# Patient Record
Sex: MALE | Race: WHITE | NOT HISPANIC OR LATINO | Employment: OTHER | URBAN - METROPOLITAN AREA
[De-identification: names, ages, dates, MRNs, and addresses within clinical notes are randomized per-mention and may not be internally consistent; named-entity substitution may affect disease eponyms.]

---

## 2019-12-14 ENCOUNTER — HOSPITAL ENCOUNTER (EMERGENCY)
Facility: HOSPITAL | Age: 39
Discharge: HOME | End: 2019-12-14
Attending: EMERGENCY MEDICINE | Admitting: EMERGENCY MEDICINE
Payer: COMMERCIAL

## 2019-12-14 ENCOUNTER — APPOINTMENT (EMERGENCY)
Dept: RADIOLOGY | Facility: HOSPITAL | Age: 39
End: 2019-12-14
Payer: COMMERCIAL

## 2019-12-14 VITALS
RESPIRATION RATE: 16 BRPM | OXYGEN SATURATION: 72 % | WEIGHT: 190 LBS | SYSTOLIC BLOOD PRESSURE: 123 MMHG | TEMPERATURE: 98.1 F | HEART RATE: 72 BPM | DIASTOLIC BLOOD PRESSURE: 66 MMHG

## 2019-12-14 DIAGNOSIS — S60.222A CONTUSION OF LEFT HAND, INITIAL ENCOUNTER: Primary | ICD-10-CM

## 2019-12-14 PROCEDURE — 73130 X-RAY EXAM OF HAND: CPT

## 2019-12-14 PROCEDURE — 99283 EMERGENCY DEPT VISIT LOW MDM: CPT

## 2019-12-15 NOTE — ED PROVIDER NOTES
History  Chief Complaint   Patient presents with    Hand Injury     patient reports johns of [employer] came down on left hand two weeks ago injuring the second third and fourth digit, has bruising on the third,      70-year-old male who presents to the ED approximately 2 weeks after he had the johns of the vehicle come down across his left hand  Patient has had swelling ecchymosis and pain to the 2nd 3rd and 4th digits of the left hand  He has had the 3rd digit in a splint since that time and still has difficulty moving it  History provided by:  Patient and spouse   used: No        None       History reviewed  No pertinent past medical history  History reviewed  No pertinent surgical history  History reviewed  No pertinent family history  I have reviewed and agree with the history as documented  Social History     Tobacco Use    Smoking status: Never Smoker    Smokeless tobacco: Never Used   Substance Use Topics    Alcohol use: Not Currently     Comment: rare    Drug use: Never        Review of Systems   Constitutional: Negative for activity change, chills, diaphoresis and fever  HENT: Negative for congestion, ear pain, nosebleeds, sore throat, trouble swallowing and voice change  Eyes: Negative for pain, discharge and redness  Respiratory: Negative for apnea, cough, choking, shortness of breath, wheezing and stridor  Cardiovascular: Negative for chest pain and palpitations  Gastrointestinal: Negative for abdominal distention, abdominal pain, constipation, diarrhea, nausea and vomiting  Endocrine: Negative for polydipsia  Genitourinary: Negative for difficulty urinating, dysuria, flank pain, frequency, hematuria and urgency  Musculoskeletal: Positive for arthralgias  Negative for back pain, gait problem, joint swelling, myalgias, neck pain and neck stiffness  Skin: Negative for pallor and rash     Neurological: Negative for dizziness, tremors, syncope, speech difficulty, weakness, numbness and headaches  Hematological: Negative for adenopathy  Psychiatric/Behavioral: Negative for confusion, hallucinations, self-injury and suicidal ideas  The patient is not nervous/anxious  Physical Exam  Physical Exam   Constitutional: He is oriented to person, place, and time  Vital signs are normal  He appears well-developed and well-nourished  No distress  HENT:   Head: Normocephalic and atraumatic  Right Ear: External ear normal    Left Ear: External ear normal    Nose: Nose normal    Mouth/Throat: Oropharynx is clear and moist    Eyes: Pupils are equal, round, and reactive to light  Conjunctivae are normal    Neck: Normal range of motion  Neck supple  Cardiovascular: Normal rate, regular rhythm, normal heart sounds and intact distal pulses  Pulmonary/Chest: Effort normal and breath sounds normal    Abdominal: Soft  Bowel sounds are normal    Musculoskeletal: Normal range of motion  He exhibits edema and tenderness  Patient has tenderness swelling from the PIP to the DI P across digits 2 3 and 4   Neurological: He is alert and oriented to person, place, and time  He has normal reflexes  Skin: Skin is warm and dry  He is not diaphoretic  Psychiatric: He has a normal mood and affect  Nursing note and vitals reviewed        Vital Signs  ED Triage Vitals [12/14/19 2126]   Temperature Pulse Respirations Blood Pressure SpO2   98 1 °F (36 7 °C) 72 16 123/66 (!) 72 %      Temp Source Heart Rate Source Patient Position - Orthostatic VS BP Location FiO2 (%)   Tympanic Monitor Lying Right arm --      Pain Score       No Pain           Vitals:    12/14/19 2126   BP: 123/66   Pulse: 72   Patient Position - Orthostatic VS: Lying         Visual Acuity      ED Medications  Medications - No data to display    Diagnostic Studies  Results Reviewed     None                 XR hand 3+ views LEFT    (Results Pending)              Procedures  Procedures         ED Course MDM      Disposition  Final diagnoses:   Contusion of left hand, initial encounter     Time reflects when diagnosis was documented in both MDM as applicable and the Disposition within this note     Time User Action Codes Description Comment    12/14/2019 10:02 PM Cortney, [address] Contusion of left hand, initial encounter       ED Disposition     ED Disposition Condition Date/Time Comment    Discharge Stable Sat Dec 14, 2019 10:02 PM Marcelo Montes discharge to home/self care  Follow-up Information     Follow up With Specialties Details Why Contact Info Additional Information    [address] [facility] Emergency Department Emergency Medicine  As needed [address] [place] [address]  [phone] [facility] ED, [facility], [facility]          Patient's Medications    No medications on file     No discharge procedures on file      ED Provider  Electronically Signed by           Kike Jones DO  12/14/19 4088

## 2019-12-30 DIAGNOSIS — I87.2 VENOUS INSUFFICIENCY OF BOTH LOWER EXTREMITIES: Primary | ICD-10-CM

## 2020-01-02 ENCOUNTER — APPOINTMENT (OUTPATIENT)
Dept: LAB | Facility: HOSPITAL | Age: 40
End: 2020-01-02
Payer: COMMERCIAL

## 2020-01-02 ENCOUNTER — TRANSCRIBE ORDERS (OUTPATIENT)
Dept: ADMINISTRATIVE | Facility: HOSPITAL | Age: 40
End: 2020-01-02

## 2020-01-02 DIAGNOSIS — Z22.4 CARRIER OR SUSPECTED CARRIER OF GONORRHEA: ICD-10-CM

## 2020-01-02 DIAGNOSIS — Z11.8 SCREENING EXAMINATION FOR TRACHOMA: ICD-10-CM

## 2020-01-02 DIAGNOSIS — Z11.3 SCREENING EXAMINATION FOR VENEREAL DISEASE: ICD-10-CM

## 2020-01-02 DIAGNOSIS — Z11.59 SCREENING EXAMINATION FOR POLIOMYELITIS: ICD-10-CM

## 2020-01-02 DIAGNOSIS — Z01.83 ENCOUNTER FOR BLOOD TYPING: ICD-10-CM

## 2020-01-02 DIAGNOSIS — Z01.83 ENCOUNTER FOR BLOOD TYPING: Primary | ICD-10-CM

## 2020-01-02 LAB
ABO GROUP BLD: NORMAL
RH BLD: NEGATIVE

## 2020-01-02 PROCEDURE — 86901 BLOOD TYPING SEROLOGIC RH(D): CPT

## 2020-01-02 PROCEDURE — 86900 BLOOD TYPING SEROLOGIC ABO: CPT

## 2020-01-02 PROCEDURE — 86790 VIRUS ANTIBODY NOS: CPT

## 2020-01-02 PROCEDURE — 86644 CMV ANTIBODY: CPT

## 2020-01-02 PROCEDURE — 87661 TRICHOMONAS VAGINALIS AMPLIF: CPT

## 2020-01-02 PROCEDURE — 87389 HIV-1 AG W/HIV-1&-2 AB AG IA: CPT

## 2020-01-02 PROCEDURE — 86706 HEP B SURFACE ANTIBODY: CPT

## 2020-01-02 PROCEDURE — 86592 SYPHILIS TEST NON-TREP QUAL: CPT

## 2020-01-02 PROCEDURE — 86803 HEPATITIS C AB TEST: CPT

## 2020-01-02 PROCEDURE — 86645 CMV ANTIBODY IGM: CPT

## 2020-01-02 PROCEDURE — 36415 COLL VENOUS BLD VENIPUNCTURE: CPT

## 2020-01-02 PROCEDURE — 87491 CHLMYD TRACH DNA AMP PROBE: CPT

## 2020-01-02 PROCEDURE — 87591 N.GONORRHOEAE DNA AMP PROB: CPT

## 2020-01-02 PROCEDURE — 86704 HEP B CORE ANTIBODY TOTAL: CPT

## 2020-01-03 ENCOUNTER — OFFICE VISIT (OUTPATIENT)
Dept: VASCULAR SURGERY | Facility: CLINIC | Age: 40
End: 2020-01-03
Payer: COMMERCIAL

## 2020-01-03 VITALS
HEIGHT: 70 IN | TEMPERATURE: 98.2 F | DIASTOLIC BLOOD PRESSURE: 62 MMHG | WEIGHT: 203 LBS | BODY MASS INDEX: 29.06 KG/M2 | SYSTOLIC BLOOD PRESSURE: 124 MMHG | HEART RATE: 57 BPM

## 2020-01-03 DIAGNOSIS — R09.89 DECREASED PULSES IN FEET: ICD-10-CM

## 2020-01-03 DIAGNOSIS — I87.2 VENOUS INSUFFICIENCY OF BOTH LOWER EXTREMITIES: Primary | ICD-10-CM

## 2020-01-03 LAB
HBV CORE AB SER QL: NORMAL
HBV SURFACE AB SER-ACNC: 3.94 MIU/ML
HCV AB SER QL: NORMAL
HIV 1+2 AB+HIV1 P24 AG SERPL QL IA: NORMAL
RPR SER QL: NORMAL

## 2020-01-03 PROCEDURE — 99244 OFF/OP CNSLTJ NEW/EST MOD 40: CPT | Performed by: PHYSICIAN ASSISTANT

## 2020-01-03 NOTE — PATIENT INSTRUCTIONS
Varicose veins with pain  Venous Insufficiency    42 y o  M longstanding history of bilateral leg pain  Patient reports that the legs get tired and painful  Noting that the legs are worse after standing or sitting for too long  He has developed venous discoloration to the R/L medial malleolus  He has been wearing compression but has continued to notice progression and worsening of the veins as well as continued symptoms  We had a detailed discussion regarding varicose veins and the treatment of venous disease  We discussed the role of compression and other conservative measures for relief of symptoms related to varicose veins      - Order for prescription graded compression stockings of 20-30 mm Hg  - Wear stocking EVERY day to help control swelling in legs and remove at night  - Elevate legs while at rest  - Continue healthy life-style changes; heart-healthy, low sodium diet; regular exercise  - Moisturize the legs to keep skin healthy  - Patient education for venous insufficiency  - Check venous reflux study  - Follow up in about 2 months        Varicose Veins   WHAT YOU NEED TO KNOW:   What are varicose veins? Varicose veins are veins that become large, twisted, and swollen  They are common on the back of the calves, knees, and thighs  Varicose veins are caused by valves in your veins that do not work properly  This causes blood to collect and increase pressure in the veins of your legs  The increased pressure causes your veins to stretch, get larger, swell, and twist        What increases my risk for varicose veins? · Pregnancy    · A family history of varicose veins    · Being overweight or obese    · Age 48 years or older    · Sitting or standing for long periods of time    · Wearing tight clothing  What are the signs and symptoms of varicose veins? Your symptoms may be worse after you stand or sit for long periods of time   You may have any of the following:  · Blue, purple, or bulging veins in your legs     · Pain, swelling, or muscle cramps in your legs    · Feeling of fatigue or heaviness in your legs  How are varicose veins diagnosed? Your healthcare provider will examine your legs and ask about your medical history  You may need tests, such as a Doppler ultrasound or duplex scan  These tests show your veins and valves, and how your blood is flowing through them  These tests may also show if there is a blockage or blood clot  How are varicose veins treated? The goal of treatment is to decrease symptoms, improve appearance, and prevent further problems  Treatment will depend on which veins are affected and how severe your condition is  You may need procedures to treat or remove your varicose veins  For example, your healthcare provider may inject a solution or use a laser to close the varicose veins  Surgery to remove long veins may also be done  Ask your healthcare provider for more information about procedures used to treat varicose veins  What can I do to manage my symptoms? · Do not sit or stand for long periods of time  This can cause the blood to collect in your legs and make your symptoms worse  Bend or rotate your ankles several times every hour  Walk around for a few minutes every hour to get blood moving in your legs  · Do not cross your legs when you sit  This decreases blood flow to your feet and can make your symptoms worse  · Do not wear tight clothing or shoes  Do not wear high-heeled shoes  Do not wear clothes that are tight around the waist or knees  · Maintain a healthy weight  Being overweight or obese can make your varicose veins worse  Ask your healthcare provider how much you should weigh  Ask him or her to help you create a weight loss plan if you are overweight  · Wear pressure stockings as directed  The stockings are tight and put pressure on your legs  They improve blood flow and help prevent clots  · Elevate your legs    Keep them above the level of your heart for 15 to 30 minutes several times a day  You can also prop the end of your bed up slightly to elevate your legs while you sleep  This will help blood to flow back to your heart  · Get regular exercise  Talk to your healthcare provider about the best exercise plan for you  Exercise can improve blood flow to your legs and feet  When should I seek immediate care? · You have a wound that does not heal or is infected  · You have an injury that has broken your skin and caused your varicose veins to bleed  · Your leg is swollen and hard  · You notice that your legs or feet are turning blue or black  · Your leg feels warm, tender, and painful  It may look swollen and red  When should I contact my healthcare provider? · You have pain in your leg that does not go away or gets worse  · You notice sudden large bruising on your legs  · You have a rash on your leg  · Your symptoms keep you from doing your daily activities  · You have questions or concerns about your condition or care  CARE AGREEMENT:   You have the right to help plan your care  Learn about your health condition and how it may be treated  Discuss treatment options with your caregivers to decide what care you want to receive  You always have the right to refuse treatment  The above information is an  only  It is not intended as medical advice for individual conditions or treatments  Talk to your doctor, nurse or pharmacist before following any medical regimen to see if it is safe and effective for you  © 2017 [employer] Information is for End User's use only and may not be sold, redistributed or otherwise used for commercial purposes  All illustrations and images included in CareNotes® are the copyrighted property of [employer]  or [employer]

## 2020-01-03 NOTE — LETTER
January 5, 2020     [facility]  [address] [facility] [address]    Patient: Sylvia Garcia   YOB: 1980   Date of Visit: 1/3/2020     Dear Dr Amanda Rangel      Thank you for referring Sylvia Garcia to me for evaluation  Below are the relevant portions of my assessment and plan of care  If you have questions, please do not hesitate to call me  I look forward to following Evelyn Lopez along with you  Sincerely,        Aylin Severino PA-C        CC: No Recipients    Progress Notes:      Assessment/Plan:    Venous insufficiency of both lower extremities    Varicose veins with pain  Venous Insufficiency    42 y o  M longstanding history of bilateral leg pain  Patient reports that the legs easily get tired and painful  The legs are worse after standing or sitting for too long  He has developed moderate venous discoloration to both the R/L medial malleolus  Despite wearing compression stockings for over 6 months, he has continued symptoms of tired, painful legs, worsening varicosities, as well as discoloration of legs  No hx bleeding veins, ulcers or blood clots  Discussion:  We had a detailed discussion regarding varicose veins and the treatment of venous disease  We discussed the role of compression and other conservative measures for relief of symptoms related to varicose veins      - Order for prescription graded compression stockings of 20-30 mm Hg  - Wear stocking EVERY day to help control swelling in legs and remove at night  - Elevate legs while at rest  - Continue healthy life-style changes; heart-healthy, low sodium diet; regular exercise for weight loss  - Patient education for venous insufficiency    - Check venous reflux study  - Follow up in about 2 months with VS    Decreased pulses in feet  -Difficult to assess L DP pulses  -Excellent PT pulses/signals bilaterally  -Active no sx of claudication  -Check baseline CHAD per patient request

## 2020-01-03 NOTE — PROGRESS NOTES
Assessment/Plan:    Venous insufficiency of both lower extremities    Varicose veins with pain  Venous Insufficiency    42 y o  M longstanding history of bilateral leg pain  Patient reports that the legs easily get tired and painful  The legs are worse after standing or sitting for too long  He has developed mild-moderate venous discoloration to both the R/L medial malleolus  Despite wearing compression stockings for over 6 months, he has continued symptoms of tired, painful legs, worsening varicosities, as well as discoloration of legs  No hx bleeding veins, ulcers or blood clots  Discussion:  We had a detailed discussion regarding varicose veins and the treatment of venous disease  We discussed the role of compression and other conservative measures for relief of symptoms related to varicose veins      - Order for prescription graded compression stockings of 20-30 mm Hg  - Wear stocking EVERY day to help control swelling in legs and remove at night  - Elevate legs while at rest  - Continue healthy life-style changes; heart-healthy, low sodium diet; regular exercise for weight loss  - Patient education for venous insufficiency  - Check venous reflux study  - Follow up in about 2 months with VS    Decreased pulses in feet  -Difficult to assess L DP pulses  -Excellent PT pulses/signals bilaterally  -Active; no sx of claudication  -Check baseline CHAD per patient request       Subjective:      Patient ID: Jeri Spangler is a 42 y o  male  New patient referred by Marco A Mercer MD  Patient presents in office for B/L leg discoloration and pain x 10 years  Patient denies it effects his walking, but he feels like his legs are more tired  Patient reports dicoloration of blue, red, and purple at times in the ankle region  Patient denies any swelling  Patient reports wearing compression socks  Patient denies any injury to his ankles or legs  Patient is a someday smoker of hooka/vaping      HPI     Jeri Spangler 60 y o  Shayy Sizer longstanding history of bilateral leg pain  Patient reports that the legs easily get tired and painful  The legs are worse after standing or sitting for too long  He has developed mild to moderate venous discoloration to both the R/L medial malleolus  Despite wearing compression stockings for over 6 months, he has continued symptoms of tired, painful legs, worsening varicosities, as well as discoloration of legs  No hx bleeding veins, ulcers or blood clots  He is accompanied by Dr Igor Thomas  The following portions of the patient's history were reviewed and updated as appropriate: allergies, current medications, past family history, past medical history, past social history, past surgical history and problem list     Review of Systems   Constitutional: Negative  HENT: Negative  Eyes: Negative  Respiratory: Negative  Cardiovascular: Negative  Negative for leg swelling  Gastrointestinal: Negative  Endocrine: Negative  Genitourinary: Negative  Musculoskeletal: Negative  Leg pain   Skin: Positive for color change  Allergic/Immunologic: Negative  Neurological: Negative  Hematological: Negative  Psychiatric/Behavioral: Negative  Objective:      /62 (BP Location: Left arm, Patient Position: Sitting, Cuff Size: Adult)   Pulse 57   Temp 98 2 °F (36 8 °C) (Tympanic)   Ht 5' 10" (1 778 m)   Wt 92 1 kg (203 lb)   BMI 29 13 kg/m²          Physical Exam   Constitutional: He is oriented to person, place, and time  He appears well-developed and well-nourished  He is cooperative  HENT:   Head: Normocephalic and atraumatic  Eyes: Pupils are equal, round, and reactive to light  EOM are normal    Neck: Trachea normal  Neck supple  No JVD present  No thyromegaly present  Cardiovascular: Normal rate, regular rhythm, S1 normal, S2 normal and normal heart sounds  Exam reveals no gallop and no friction rub  No murmur heard    Pulses:       Carotid pulses are 2+ on the right side, and 2+ on the left side  Radial pulses are 2+ on the right side, and 2+ on the left side  Dorsalis pedis pulses are 1+ on the right side  Posterior tibial pulses are 2+ on the right side, and 1+ on the left side  DP pulses difficulty to assess  There are good PT pulses  Excellent PT signals bilaterally  Feet are motor-sensory intact with normal cap refill  No wounds  + small/spider veins around the ankles with early stasis changes; skin healthy and intact  Pulmonary/Chest: Effort normal and breath sounds normal  No accessory muscle usage  No respiratory distress  He has no wheezes  He has no rales  Abdominal: Soft  Bowel sounds are normal  He exhibits no distension  There is no hepatosplenomegaly  There is no tenderness  Musculoskeletal: Normal range of motion  He exhibits no edema or deformity  Neurological: He is alert and oriented to person, place, and time  Grossly normal    Skin: Skin is warm and dry  No lesion and no rash noted  No cyanosis  Nails show no clubbing  Psychiatric: He has a normal mood and affect  Nursing note and vitals reviewed  I have reviewed and made appropriate changes to the review of systems input by the medical assistant  Vitals:    01/03/20 0909   BP: 124/62   BP Location: Left arm   Patient Position: Sitting   Cuff Size: Adult   Pulse: 57   Temp: 98 2 °F (36 8 °C)   TempSrc: Tympanic   Weight: 92 1 kg (203 lb)   Height: 5' 10" (1 778 m)       Patient Active Problem List   Diagnosis    Venous insufficiency of both lower extremities    Decreased pulses in feet       History reviewed  No pertinent surgical history  History reviewed  No pertinent family history      Social History     Socioeconomic History    Marital status: /Civil Union     Spouse name: Not on file    Number of children: Not on file    Years of education: Not on file    Highest education level: Not on file   Occupational History    Not on file   Social Needs    Financial resource strain: Not on file    Food insecurity:     Worry: Not on file     Inability: Not on file    Transportation needs:     Medical: Not on file     Non-medical: Not on file   Tobacco Use    Smoking status: Current Some Day Smoker    Smokeless tobacco: Never Used   Substance and Sexual Activity    Alcohol use: Not Currently     Comment: rare    Drug use: Never    Sexual activity: Not on file   Lifestyle    Physical activity:     Days per week: Not on file     Minutes per session: Not on file    Stress: Not on file   Relationships    Social connections:     Talks on phone: Not on file     Gets together: Not on file     Attends Taoist service: Not on file     Active member of club or organization: Not on file     Attends meetings of clubs or organizations: Not on file     Relationship status: Not on file    Intimate partner violence:     Fear of current or ex partner: Not on file     Emotionally abused: Not on file     Physically abused: Not on file     Forced sexual activity: Not on file   Other Topics Concern    Not on file   Social History Narrative    Not on file       No Known Allergies    No current outpatient medications on file

## 2020-01-04 LAB
C TRACH DNA SPEC QL NAA+PROBE: NEGATIVE
CMV IGG SERPL IA-ACNC: <0.6 U/ML (ref 0–0.59)
CMV IGM SERPL IA-ACNC: <30 AU/ML (ref 0–29.9)
N GONORRHOEA DNA SPEC QL NAA+PROBE: NEGATIVE

## 2020-01-05 PROBLEM — R09.89 DECREASED PULSES IN FEET: Status: ACTIVE | Noted: 2020-01-05

## 2020-01-05 NOTE — ASSESSMENT & PLAN NOTE
Varicose veins with pain  Venous Insufficiency    42 y o  M longstanding history of bilateral leg pain  Patient reports that the legs easily get tired and painful  The legs are worse after standing or sitting for too long  He has developed mild-moderate venous discoloration to both the R/L medial malleolus  Despite wearing compression stockings for over 6 months, he has continued symptoms of tired, painful legs, worsening varicosities, as well as discoloration of legs  No hx bleeding veins, ulcers or blood clots  Discussion:  We had a detailed discussion regarding varicose veins and the treatment of venous disease  We discussed the role of compression and other conservative measures for relief of symptoms related to varicose veins      - Order for prescription graded compression stockings of 20-30 mm Hg  - Wear stocking EVERY day to help control swelling in legs and remove at night  - Elevate legs while at rest  - Continue healthy life-style changes; heart-healthy, low sodium diet; regular exercise for weight loss  - Patient education for venous insufficiency    - Check venous reflux study  - Follow up in about 2 months with VS

## 2020-01-05 NOTE — ASSESSMENT & PLAN NOTE
-Difficult to assess L DP pulses  -Excellent PT pulses/signals bilaterally  -Active; no sx of claudication  -Check baseline CHAD per patient request

## 2020-01-06 LAB
HTLV I+II AB SER QL IA: NEGATIVE
T VAGINALIS RRNA SPEC QL NAA+PROBE: NEGATIVE

## 2020-01-08 ENCOUNTER — HOSPITAL ENCOUNTER (OUTPATIENT)
Dept: RADIOLOGY | Facility: HOSPITAL | Age: 40
Discharge: HOME | End: 2020-01-08
Payer: COMMERCIAL

## 2020-01-08 DIAGNOSIS — I87.2 VENOUS INSUFFICIENCY OF BOTH LOWER EXTREMITIES: ICD-10-CM

## 2020-01-08 LAB
CMV DNA SERPL NAA+PROBE-ACNC: NEGATIVE IU/ML
CMV DNA SERPL NAA+PROBE-LOG IU: NORMAL LOG10 IU/ML

## 2020-01-08 PROCEDURE — 93923 UPR/LXTR ART STDY 3+ LVLS: CPT

## 2020-01-08 PROCEDURE — 93970 EXTREMITY STUDY: CPT | Performed by: SURGERY

## 2020-01-08 PROCEDURE — 93970 EXTREMITY STUDY: CPT

## 2020-01-08 PROCEDURE — 93923 UPR/LXTR ART STDY 3+ LVLS: CPT | Performed by: SURGERY

## 2020-01-09 ENCOUNTER — OFFICE VISIT (OUTPATIENT)
Dept: VASCULAR SURGERY | Facility: CLINIC | Age: 40
End: 2020-01-09
Payer: COMMERCIAL

## 2020-01-09 VITALS
HEIGHT: 70 IN | TEMPERATURE: 98.1 F | HEART RATE: 70 BPM | BODY MASS INDEX: 28.77 KG/M2 | SYSTOLIC BLOOD PRESSURE: 102 MMHG | DIASTOLIC BLOOD PRESSURE: 76 MMHG | WEIGHT: 201 LBS

## 2020-01-09 DIAGNOSIS — I87.2 VENOUS INSUFFICIENCY (CHRONIC) (PERIPHERAL): Primary | ICD-10-CM

## 2020-01-09 DIAGNOSIS — R09.89 DECREASED PULSES IN FEET: ICD-10-CM

## 2020-01-09 DIAGNOSIS — I87.2 VENOUS INSUFFICIENCY OF BOTH LOWER EXTREMITIES: ICD-10-CM

## 2020-01-09 PROCEDURE — 99213 OFFICE O/P EST LOW 20 MIN: CPT | Performed by: SURGERY

## 2020-01-09 NOTE — PATIENT INSTRUCTIONS
Decreased pulses in feet  Decreased pulses in feet  CHAD study obtained was normal with triphasic waveforms throughout  Faint palpable DP/PT pulses  Denies claudication, rest pain or tissue loss       Venous insufficiency of both lower extremities  Bilateral ankle telangiectasis with mild venous congestion, no significant varicosities  Chronic ankle aching  Preliminary venous reflux study suggestive of left GSV venous insufficiency and deep venous incompetence  Has been wearing over the counter below the knee compression socks which end at the ankle intermittently, unknown degree of compression      -We discussed the physiology of venous disease, available treatment options and indications for treatment  -Recommended a trial of conservative measures   This includes the daily use of gradient compression socks, periodic leg elevation and regular exercise  -Rx 20-30 mmHg compression given

## 2020-01-09 NOTE — ASSESSMENT & PLAN NOTE
Decreased pulses in feet  CHAD study obtained was normal with triphasic waveforms throughout  Faint palpable DP/PT pulses  Denies claudication, rest pain or tissue loss

## 2020-01-09 NOTE — ASSESSMENT & PLAN NOTE
Bilateral ankle telangiectasis with mild venous congestion, no significant varicosities  Chronic ankle aching  Preliminary venous reflux study suggestive of left GSV venous insufficiency and deep venous incompetence  Has been wearing over the counter below the knee compression socks which end at the ankle intermittently, unknown degree of compression     -We discussed the physiology of venous disease, available treatment options and indications for treatment  -Recommended a trial of conservative measures  This includes the daily use of gradient compression socks, periodic leg elevation and regular exercise  -Rx 20-30 mmHg compression given

## 2020-01-09 NOTE — PROGRESS NOTES
Assessment/Plan:    Decreased pulses in feet  Decreased pulses in feet  CHAD study obtained was normal with triphasic waveforms throughout  Faint palpable DP/PT pulses  Denies claudication, rest pain or tissue loss  Venous insufficiency of both lower extremities  Bilateral ankle telangiectasis with mild venous congestion, no significant varicosities  Chronic ankle aching  Preliminary venous reflux study suggestive of left GSV venous insufficiency and deep venous incompetence  Has been wearing over the counter below the knee compression socks which end at the ankle intermittently, unknown degree of compression     -We discussed the physiology of venous disease, available treatment options and indications for treatment  -Recommended a trial of conservative measures  This includes the daily use of gradient compression socks, periodic leg elevation and regular exercise  -Rx 20-30 mmHg compression given  Diagnoses and all orders for this visit:    Venous insufficiency (chronic) (peripheral)  -     Compression Stocking    Venous insufficiency of both lower extremities    Decreased pulses in feet        I have spent 25 minutes with Patient  today in which greater than 50% of this time was spent in counseling/coordination of care regarding Intructions for management, Importance of tx compliance and Impressions  Subjective:      Patient ID: Nani Hensley is a 42 y o  male  Patient presents today to review testing of CHAD and reflux study done on 1/8  Patient has longstanding h/o b/l leg pain, though is currently denies leg pain  Has c/o bruising to ankles  Denies swelling  HPI  Mr Ivet Rust is a 48yo male with history of bilateral ankle aching and telangiectasias  He was seen recently by our office and sent for venous reflux studies  He returns for follow-up today  He continues to report ankle aching  He does not have significant swelling  No varicosities   He wears compression stockings that he bought at [employer] which go from the ankle to below the knee intermittently and has not seen much benefit  He does not routinely elevate his legs and personally feels he could exercise more  The following portions of the patient's history were reviewed and updated as appropriate: allergies, current medications, past family history, past medical history, past social history, past surgical history and problem list     Review of Systems   Constitutional: Negative  HENT: Negative  Eyes: Negative  Respiratory: Negative  Cardiovascular: Negative  Gastrointestinal: Negative  Endocrine: Negative  Genitourinary: Negative  Musculoskeletal: Negative  Skin: Negative  Allergic/Immunologic: Negative  Neurological: Negative  Hematological: Negative  Psychiatric/Behavioral: Negative  I have personally reviewed the ROS entered by MA and agree as documented  Objective:      /76 (BP Location: Right arm, Patient Position: Sitting)   Pulse 70   Temp 98 1 °F (36 7 °C) (Tympanic)   Ht 5' 10" (1 778 m)   Wt 91 2 kg (201 lb)   BMI 28 84 kg/m²          Physical Exam   Constitutional: He is oriented to person, place, and time  He appears well-developed and well-nourished  HENT:   Head: Normocephalic and atraumatic  Eyes: EOM are normal    Neck: Normal range of motion  Neck supple  Cardiovascular: Normal rate and regular rhythm  Pulses:       Dorsalis pedis pulses are 1+ on the right side, and 1+ on the left side  Posterior tibial pulses are 1+ on the right side, and 1+ on the left side  Pulmonary/Chest: Effort normal    Abdominal: Soft  Musculoskeletal: Normal range of motion  Neurological: He is alert and oriented to person, place, and time  Skin: Skin is warm and dry  Capillary refill takes less than 2 seconds  Bilateral ankle telangiectasias with purplish discoloration   Psychiatric: He has a normal mood and affect   His behavior is normal  Judgment and thought content normal    Nursing note and vitals reviewed

## 2020-02-04 DIAGNOSIS — R10.32 LEFT INGUINAL PAIN: ICD-10-CM

## 2020-02-25 ENCOUNTER — APPOINTMENT (OUTPATIENT)
Dept: LAB | Facility: HOSPITAL | Age: 40
End: 2020-02-25
Payer: COMMERCIAL

## 2020-02-25 ENCOUNTER — TRANSCRIBE ORDERS (OUTPATIENT)
Dept: ADMINISTRATIVE | Facility: HOSPITAL | Age: 40
End: 2020-02-25

## 2020-02-25 ENCOUNTER — HOSPITAL ENCOUNTER (OUTPATIENT)
Dept: RADIOLOGY | Facility: HOSPITAL | Age: 40
Discharge: HOME | End: 2020-02-25
Attending: STUDENT IN AN ORGANIZED HEALTH CARE EDUCATION/TRAINING PROGRAM
Payer: COMMERCIAL

## 2020-02-25 DIAGNOSIS — R10.32 LEFT INGUINAL PAIN: ICD-10-CM

## 2020-02-25 DIAGNOSIS — N46.11 OLIGOSPERMIA: Primary | ICD-10-CM

## 2020-02-25 DIAGNOSIS — N46.11 OLIGOSPERMIA: ICD-10-CM

## 2020-02-25 LAB
EST. AVERAGE GLUCOSE BLD GHB EST-MCNC: 114 MG/DL
ESTRADIOL SERPL-MCNC: 25 PG/ML (ref 11–52.5)
FSH SERPL-ACNC: 3.5 MIU/ML (ref 0.7–10.8)
HBA1C MFR BLD: 5.6 %
LH SERPL-ACNC: 2.1 MIU/ML (ref 1.2–10.6)
PROLACTIN SERPL-MCNC: 7.6 NG/ML (ref 2.5–17.4)
TSH SERPL DL<=0.05 MIU/L-ACNC: 2.15 UIU/ML (ref 0.36–3.74)

## 2020-02-25 PROCEDURE — 84403 ASSAY OF TOTAL TESTOSTERONE: CPT

## 2020-02-25 PROCEDURE — 84402 ASSAY OF FREE TESTOSTERONE: CPT

## 2020-02-25 PROCEDURE — 83001 ASSAY OF GONADOTROPIN (FSH): CPT

## 2020-02-25 PROCEDURE — 81479 UNLISTED MOLECULAR PATHOLOGY: CPT | Performed by: OBSTETRICS & GYNECOLOGY

## 2020-02-25 PROCEDURE — 83002 ASSAY OF GONADOTROPIN (LH): CPT

## 2020-02-25 PROCEDURE — 84146 ASSAY OF PROLACTIN: CPT

## 2020-02-25 PROCEDURE — 88262 CHROMOSOME ANALYSIS 15-20: CPT

## 2020-02-25 PROCEDURE — 84443 ASSAY THYROID STIM HORMONE: CPT

## 2020-02-25 PROCEDURE — 83036 HEMOGLOBIN GLYCOSYLATED A1C: CPT

## 2020-02-25 PROCEDURE — 88230 TISSUE CULTURE LYMPHOCYTE: CPT

## 2020-02-25 PROCEDURE — 76870 US EXAM SCROTUM: CPT

## 2020-02-25 PROCEDURE — 82670 ASSAY OF TOTAL ESTRADIOL: CPT

## 2020-02-25 PROCEDURE — 36415 COLL VENOUS BLD VENIPUNCTURE: CPT | Performed by: OBSTETRICS & GYNECOLOGY

## 2020-02-26 LAB
TESTOST FREE SERPL-MCNC: 9.5 PG/ML (ref 8.7–25.1)
TESTOST SERPL-MCNC: 428 NG/DL (ref 264–916)

## 2020-03-02 LAB — Y CHROM DEL BLD/T: NORMAL

## 2020-03-05 LAB
CELLS ANALYZED: 20
CELLS COUNTED AMN: 20
CELLS KARYOTYPED.TOTAL BLD/T: 2
CLINICAL CYTOGENETICIST SPEC: NORMAL
ISCN BAND LEVEL QL: 500
KARYOTYP BLD/T: NORMAL
KARYOTYP BLD/T: NORMAL
SPECIMEN SOURCE: NORMAL